# Patient Record
Sex: FEMALE | Race: WHITE | NOT HISPANIC OR LATINO | URBAN - METROPOLITAN AREA
[De-identification: names, ages, dates, MRNs, and addresses within clinical notes are randomized per-mention and may not be internally consistent; named-entity substitution may affect disease eponyms.]

---

## 2018-11-02 NOTE — PATIENT DISCUSSION
IMPRIMIS. Pt elects CV zena, Topical, No van. wants best possible DV. Aware will need readers for all intermediate/ near tasks.

## 2018-11-15 NOTE — PATIENT DISCUSSION
The patient feels that the cataract is significantly impacting daily activities and has elected cataract surgery. The risks, benefits, and alternatives to surgery were discussed. The patient elects to proceed with surgery. patient elects pciol os cv goal zena.

## 2020-09-04 ENCOUNTER — OFFICE VISIT (OUTPATIENT)
Dept: URBAN - METROPOLITAN AREA CLINIC 68 | Facility: CLINIC | Age: 85
End: 2020-09-04

## 2020-12-21 ENCOUNTER — NEW PATIENT COMPREHENSIVE (OUTPATIENT)
Dept: URBAN - METROPOLITAN AREA CLINIC 32 | Facility: CLINIC | Age: 85
End: 2020-12-21

## 2020-12-21 DIAGNOSIS — H43.813: ICD-10-CM

## 2020-12-21 PROCEDURE — 92004 COMPRE OPH EXAM NEW PT 1/>: CPT

## 2020-12-21 ASSESSMENT — TONOMETRY
OS_IOP_MMHG: 12
OD_IOP_MMHG: 12

## 2020-12-21 ASSESSMENT — VISUAL ACUITY
OD_SC: J1
OS_SC: J2
OD_PH: 20/30-2
OS_SC: 20/20
OD_SC: 20/100-1

## 2021-01-31 ENCOUNTER — OFFICE VISIT (OUTPATIENT)
Dept: URBAN - METROPOLITAN AREA CLINIC 68 | Facility: CLINIC | Age: 86
End: 2021-01-31

## 2021-02-21 ENCOUNTER — OFFICE VISIT (OUTPATIENT)
Dept: URBAN - METROPOLITAN AREA CLINIC 68 | Facility: CLINIC | Age: 86
End: 2021-02-21

## 2021-11-12 ENCOUNTER — OFFICE VISIT (OUTPATIENT)
Dept: URBAN - METROPOLITAN AREA CLINIC 68 | Facility: CLINIC | Age: 86
End: 2021-11-12

## 2022-01-06 ENCOUNTER — COMPREHENSIVE EXAM (OUTPATIENT)
Dept: URBAN - METROPOLITAN AREA CLINIC 32 | Facility: CLINIC | Age: 87
End: 2022-01-06

## 2022-01-06 DIAGNOSIS — H43.813: ICD-10-CM

## 2022-01-06 PROCEDURE — 92014 COMPRE OPH EXAM EST PT 1/>: CPT

## 2022-01-06 ASSESSMENT — VISUAL ACUITY
OD_SC: 20/20
OS_SC: 20/100+1
OU_SC: J1+
OU_SC: 20/20-2
OS_PH: 20/50-1

## 2022-01-06 ASSESSMENT — TONOMETRY
OD_IOP_MMHG: 15
OS_IOP_MMHG: 13

## 2022-02-01 ENCOUNTER — OFFICE VISIT (OUTPATIENT)
Dept: URBAN - METROPOLITAN AREA CLINIC 68 | Facility: CLINIC | Age: 87
End: 2022-02-01

## 2022-03-02 ENCOUNTER — TELEPHONE ENCOUNTER (OUTPATIENT)
Dept: URBAN - METROPOLITAN AREA CLINIC 68 | Facility: CLINIC | Age: 87
End: 2022-03-02

## 2022-03-02 ENCOUNTER — OFFICE VISIT (OUTPATIENT)
Dept: URBAN - METROPOLITAN AREA SURGERY CENTER 12 | Facility: SURGERY CENTER | Age: 87
End: 2022-03-02

## 2022-03-04 ENCOUNTER — LAB OUTSIDE AN ENCOUNTER (OUTPATIENT)
Dept: URBAN - METROPOLITAN AREA CLINIC 68 | Facility: CLINIC | Age: 87
End: 2022-03-04

## 2022-03-04 LAB — 01: (no result)

## 2022-03-07 ENCOUNTER — TELEPHONE ENCOUNTER (OUTPATIENT)
Dept: URBAN - METROPOLITAN AREA CLINIC 68 | Facility: CLINIC | Age: 87
End: 2022-03-07

## 2022-06-04 ENCOUNTER — TELEPHONE ENCOUNTER (OUTPATIENT)
Dept: URBAN - METROPOLITAN AREA CLINIC 68 | Facility: CLINIC | Age: 87
End: 2022-06-04

## 2022-06-04 RX ORDER — POLYETHYLENE GLYCOL 3350, SODIUM SULFATE, SODIUM CHLORIDE, POTASSIUM CHLORIDE, ASCORBIC ACID, SODIUM ASCORBATE 140-9-5.2G
KIT ORAL DAILY
Qty: 1 | Refills: 0 | OUTPATIENT
Start: 2022-02-01 | End: 2022-02-02

## 2022-06-04 RX ORDER — WHEAT DEXTRIN 3 G/4 G
POWDER (GRAM) ORAL
Qty: 0 | Refills: 0 | OUTPATIENT
Start: 2019-04-04 | End: 2019-05-15

## 2022-06-04 RX ORDER — POLYETHYLENE GLYCOL 3350 17 G/DOSE
POWDER (GRAM) ORAL AS DIRECTED
Qty: 1 | Refills: 0 | OUTPATIENT
Start: 2019-04-17 | End: 2019-04-18

## 2022-06-04 RX ORDER — ATENOLOL 25 MG/1
ATENOLOL( 25MG ORAL  TWICE DAILY ) INACTIVE -HX ENTRY TABLET ORAL TWICE DAILY
OUTPATIENT
Start: 2019-02-21

## 2022-06-04 RX ORDER — SODIUM SULFATE, POTASSIUM SULFATE, MAGNESIUM SULFATE 17.5; 3.13; 1.6 G/ML; G/ML; G/ML
SOLUTION, CONCENTRATE ORAL AS DIRECTED
Qty: 1 | Refills: 0 | OUTPATIENT
Start: 2019-02-21 | End: 2019-02-22

## 2022-06-04 RX ORDER — ASPIRIN 325 MG/1
ASPIRIN( 325MG ORAL  DAILY ) INACTIVE -HX ENTRY TABLET ORAL DAILY
OUTPATIENT
Start: 2021-11-12

## 2022-06-04 RX ORDER — PANTOPRAZOLE SODIUM 40 MG/1
TABLET, DELAYED RELEASE ORAL DAILY
Qty: 60 | Refills: 0 | OUTPATIENT
Start: 2019-11-18 | End: 2020-01-17

## 2022-06-04 RX ORDER — RANITIDINE HYDROCHLORIDE 150 MG/1
CAPSULE ORAL DAILY
Qty: 30 | Refills: 30 | OUTPATIENT
Start: 2019-02-21 | End: 2019-04-04

## 2022-06-05 ENCOUNTER — TELEPHONE ENCOUNTER (OUTPATIENT)
Dept: URBAN - METROPOLITAN AREA CLINIC 68 | Facility: CLINIC | Age: 87
End: 2022-06-05

## 2022-06-05 RX ORDER — CLONAZEPAM 0.5 MG/1
KLONOPIN( 0.5MG ORAL  DAILY ) ACTIVE -HX ENTRY TABLET ORAL DAILY
Status: ACTIVE | COMMUNITY
Start: 2022-02-01

## 2022-06-05 RX ORDER — LEVOTHYROXINE SODIUM 50 UG/1
SYNTHROID( 50MCG ORAL  DAILY ) ACTIVE -HX ENTRY TABLET ORAL DAILY
Status: ACTIVE | COMMUNITY
Start: 2022-02-01

## 2022-06-05 RX ORDER — ATENOLOL 100 MG/1
ATENOLOL( 100MG ORAL   ) ACTIVE -HX ENTRY TABLET ORAL
Status: ACTIVE | COMMUNITY
Start: 2022-02-01

## 2022-06-25 ENCOUNTER — TELEPHONE ENCOUNTER (OUTPATIENT)
Age: 87
End: 2022-06-25

## 2022-06-25 RX ORDER — PANTOPRAZOLE 40 MG/1
TABLET, DELAYED RELEASE ORAL DAILY
Qty: 60 | Refills: 0 | OUTPATIENT
Start: 2019-11-18 | End: 2020-01-17

## 2022-06-25 RX ORDER — WHEAT DEXTRIN 3 G/4 G
POWDER (GRAM) ORAL
Qty: 0 | Refills: 0 | OUTPATIENT
Start: 2019-04-04 | End: 2019-05-15

## 2022-06-25 RX ORDER — LORATADINE 5 MG
TABLET,CHEWABLE ORAL AS DIRECTED
Qty: 1 | Refills: 0 | OUTPATIENT
Start: 2019-04-17 | End: 2019-04-18

## 2022-06-25 RX ORDER — ATENOLOL 25 MG/1
ATENOLOL( 25MG ORAL  TWICE DAILY ) INACTIVE -HX ENTRY TABLET ORAL TWICE DAILY
OUTPATIENT
Start: 2019-02-21

## 2022-06-25 RX ORDER — POLYETHYLENE GLYCOL 3350, SODIUM SULFATE, SODIUM CHLORIDE, POTASSIUM CHLORIDE, ASCORBIC ACID, SODIUM ASCORBATE 140-9-5.2G
KIT ORAL DAILY
Qty: 1 | Refills: 0 | OUTPATIENT
Start: 2022-02-01 | End: 2022-02-02

## 2022-06-25 RX ORDER — SODIUM SULFATE, POTASSIUM SULFATE, MAGNESIUM SULFATE 17.5; 3.13; 1.6 G/ML; G/ML; G/ML
SOLUTION, CONCENTRATE ORAL AS DIRECTED
Qty: 1 | Refills: 0 | OUTPATIENT
Start: 2019-02-21 | End: 2019-02-22

## 2022-06-25 RX ORDER — ASPIRIN 325 MG/1
ASPIRIN( 325MG ORAL  DAILY ) INACTIVE -HX ENTRY TABLET ORAL DAILY
OUTPATIENT
Start: 2021-11-12

## 2022-06-26 ENCOUNTER — TELEPHONE ENCOUNTER (OUTPATIENT)
Age: 87
End: 2022-06-26

## 2022-06-26 RX ORDER — CLONAZEPAM 0.5 MG/1
KLONOPIN( 0.5MG ORAL  DAILY ) ACTIVE -HX ENTRY TABLET ORAL DAILY
Status: ACTIVE | COMMUNITY
Start: 2022-02-01

## 2022-06-26 RX ORDER — ASPIRIN 81 MG/1
ASPIRIN( 81MG ORAL   ) ACTIVE -HX ENTRY TABLET, DELAYED RELEASE ORAL
Status: ACTIVE | COMMUNITY
Start: 2022-02-01

## 2022-06-26 RX ORDER — LEVOTHYROXINE SODIUM 50 UG/1
SYNTHROID( 50MCG ORAL  DAILY ) ACTIVE -HX ENTRY TABLET ORAL DAILY
Status: ACTIVE | COMMUNITY
Start: 2022-02-01

## 2022-06-26 RX ORDER — ATENOLOL 100 MG/1
ATENOLOL( 100MG ORAL   ) ACTIVE -HX ENTRY TABLET ORAL
Status: ACTIVE | COMMUNITY
Start: 2022-02-01